# Patient Record
Sex: FEMALE | Race: WHITE | HISPANIC OR LATINO
[De-identification: names, ages, dates, MRNs, and addresses within clinical notes are randomized per-mention and may not be internally consistent; named-entity substitution may affect disease eponyms.]

---

## 2019-06-21 ENCOUNTER — RESULT REVIEW (OUTPATIENT)
Age: 29
End: 2019-06-21

## 2020-08-10 ENCOUNTER — EMERGENCY (EMERGENCY)
Facility: HOSPITAL | Age: 30
LOS: 1 days | Discharge: HOME | End: 2020-08-10
Attending: EMERGENCY MEDICINE
Payer: COMMERCIAL

## 2020-08-10 VITALS
WEIGHT: 187.39 LBS | SYSTOLIC BLOOD PRESSURE: 101 MMHG | OXYGEN SATURATION: 100 % | HEIGHT: 62.6 IN | HEART RATE: 90 BPM | RESPIRATION RATE: 22 BRPM | TEMPERATURE: 98 F | DIASTOLIC BLOOD PRESSURE: 63 MMHG

## 2020-08-10 LAB
ALBUMIN SERPL ELPH-MCNC: 3.5 G/DL — SIGNIFICANT CHANGE UP (ref 3.5–5)
ALP SERPL-CCNC: 65 U/L — SIGNIFICANT CHANGE UP (ref 40–120)
ALT FLD-CCNC: 16 U/L DA — SIGNIFICANT CHANGE UP (ref 10–60)
ANION GAP SERPL CALC-SCNC: 6 MMOL/L — SIGNIFICANT CHANGE UP (ref 5–17)
AST SERPL-CCNC: 10 U/L — SIGNIFICANT CHANGE UP (ref 10–40)
BASOPHILS # BLD AUTO: 0.05 K/UL — SIGNIFICANT CHANGE UP (ref 0–0.2)
BASOPHILS NFR BLD AUTO: 0.5 % — SIGNIFICANT CHANGE UP (ref 0–2)
BILIRUB SERPL-MCNC: 0.3 MG/DL — SIGNIFICANT CHANGE UP (ref 0.2–1.2)
BUN SERPL-MCNC: 10 MG/DL — SIGNIFICANT CHANGE UP (ref 7–18)
CALCIUM SERPL-MCNC: 9.2 MG/DL — SIGNIFICANT CHANGE UP (ref 8.4–10.5)
CHLORIDE SERPL-SCNC: 102 MMOL/L — SIGNIFICANT CHANGE UP (ref 96–108)
CO2 SERPL-SCNC: 30 MMOL/L — SIGNIFICANT CHANGE UP (ref 22–31)
CREAT SERPL-MCNC: 0.83 MG/DL — SIGNIFICANT CHANGE UP (ref 0.5–1.3)
D DIMER BLD IA.RAPID-MCNC: 188 NG/ML DDU — SIGNIFICANT CHANGE UP
EOSINOPHIL # BLD AUTO: 0.19 K/UL — SIGNIFICANT CHANGE UP (ref 0–0.5)
EOSINOPHIL NFR BLD AUTO: 2.1 % — SIGNIFICANT CHANGE UP (ref 0–6)
GLUCOSE SERPL-MCNC: 94 MG/DL — SIGNIFICANT CHANGE UP (ref 70–99)
HCT VFR BLD CALC: 39.1 % — SIGNIFICANT CHANGE UP (ref 34.5–45)
HGB BLD-MCNC: 13 G/DL — SIGNIFICANT CHANGE UP (ref 11.5–15.5)
IMM GRANULOCYTES NFR BLD AUTO: 0.2 % — SIGNIFICANT CHANGE UP (ref 0–1.5)
LYMPHOCYTES # BLD AUTO: 3.06 K/UL — SIGNIFICANT CHANGE UP (ref 1–3.3)
LYMPHOCYTES # BLD AUTO: 33.1 % — SIGNIFICANT CHANGE UP (ref 13–44)
MCHC RBC-ENTMCNC: 27.5 PG — SIGNIFICANT CHANGE UP (ref 27–34)
MCHC RBC-ENTMCNC: 33.2 GM/DL — SIGNIFICANT CHANGE UP (ref 32–36)
MCV RBC AUTO: 82.7 FL — SIGNIFICANT CHANGE UP (ref 80–100)
MONOCYTES # BLD AUTO: 0.63 K/UL — SIGNIFICANT CHANGE UP (ref 0–0.9)
MONOCYTES NFR BLD AUTO: 6.8 % — SIGNIFICANT CHANGE UP (ref 2–14)
NEUTROPHILS # BLD AUTO: 5.29 K/UL — SIGNIFICANT CHANGE UP (ref 1.8–7.4)
NEUTROPHILS NFR BLD AUTO: 57.3 % — SIGNIFICANT CHANGE UP (ref 43–77)
NRBC # BLD: 0 /100 WBCS — SIGNIFICANT CHANGE UP (ref 0–0)
NT-PROBNP SERPL-SCNC: <5 PG/ML — SIGNIFICANT CHANGE UP (ref 0–125)
PLATELET # BLD AUTO: 311 K/UL — SIGNIFICANT CHANGE UP (ref 150–400)
POTASSIUM SERPL-MCNC: 4.2 MMOL/L — SIGNIFICANT CHANGE UP (ref 3.5–5.3)
POTASSIUM SERPL-SCNC: 4.2 MMOL/L — SIGNIFICANT CHANGE UP (ref 3.5–5.3)
PROT SERPL-MCNC: 7.7 G/DL — SIGNIFICANT CHANGE UP (ref 6–8.3)
RBC # BLD: 4.73 M/UL — SIGNIFICANT CHANGE UP (ref 3.8–5.2)
RBC # FLD: 13 % — SIGNIFICANT CHANGE UP (ref 10.3–14.5)
SODIUM SERPL-SCNC: 138 MMOL/L — SIGNIFICANT CHANGE UP (ref 135–145)
TROPONIN I SERPL-MCNC: <0.015 NG/ML — SIGNIFICANT CHANGE UP (ref 0–0.04)
WBC # BLD: 9.24 K/UL — SIGNIFICANT CHANGE UP (ref 3.8–10.5)
WBC # FLD AUTO: 9.24 K/UL — SIGNIFICANT CHANGE UP (ref 3.8–10.5)

## 2020-08-10 PROCEDURE — 80053 COMPREHEN METABOLIC PANEL: CPT

## 2020-08-10 PROCEDURE — 83880 ASSAY OF NATRIURETIC PEPTIDE: CPT

## 2020-08-10 PROCEDURE — 85379 FIBRIN DEGRADATION QUANT: CPT

## 2020-08-10 PROCEDURE — 84484 ASSAY OF TROPONIN QUANT: CPT

## 2020-08-10 PROCEDURE — 93005 ELECTROCARDIOGRAM TRACING: CPT

## 2020-08-10 PROCEDURE — U0003: CPT

## 2020-08-10 PROCEDURE — 36415 COLL VENOUS BLD VENIPUNCTURE: CPT

## 2020-08-10 PROCEDURE — 99284 EMERGENCY DEPT VISIT MOD MDM: CPT

## 2020-08-10 PROCEDURE — 85027 COMPLETE CBC AUTOMATED: CPT

## 2020-08-10 PROCEDURE — 99283 EMERGENCY DEPT VISIT LOW MDM: CPT

## 2020-08-10 NOTE — ED PROVIDER NOTE - NSFOLLOWUPINSTRUCTIONS_ED_ALL_ED_FT
We have swabbed you for COVID today and/or sent your antibodies.  Results can take from 1-5 days, depending on how backed up other laboratory is.  Remember, the results are only 70% accurate, so if you have COVID symptoms, you still likely have COVID even if your test is negative.  We rely on SYMPTOMS more than the test.    **If you HAVE SYMPTOMS, please SELF ISOLATE for 14 DAYS MINIMUM  **If you HAVE FEVER, you must SELF ISOLATE until the fever is gone, PLUS ONE DAY.    (For Example, If you have FEVER for 6 days, then you must ISOLATE for 14 days (that is the minimum)  If you have FEVER for 18 days, and then on day 19 you have no fever, you must wait 24 hours, so you can leave your house/interact with others on Day 20.)    ** If you had exposure to COVID but no symptoms and are COVID POSITIVE,  YOU ARE TO QUARENTINE FOR 10 DAYS    **** If you had exposure to COVID but have no symptoms and are COVID NEGATIVE, we still recommend you QUARENTINE FOR 10 DAYS, because the test is only 70% accurate, so sometimes it is wrong.  You staying home and isolated will help [place] COVID cases stay low.     Thankfully survival rates are much better than they were before.  The fear and anxiety that comes from COVID often times has longer lasting effects than the disease itself.  Please stay calm.    If you are having symptoms, we recommend you buy a PULSE OXIMETER to check your Oxygen level every day and/or if you're short of breath.  If your Oxygen is < 93% or you are severely short of breath, return to the ER.  Otherwise, stay home and isolated.      TIPS TO HELP FIGHT SYMPTOMS AND MINIMIZE SPREAD:    1. STAY HOME for at least 14 DAYS.  Wait until you have NO SYMPTOMS FOR 3 days before you leave your house.    2. Stay 6 feet away from everyone in your life, especially the elderly.  If you live with elderly, try to find them (or yourself) another place to stay.      3. If you have sypmtoms, ALWAYS WEAR A MASK, even at home, to protect your family.    4.  Sleep in a seperate bed from anyone who do not have symptoms.       5. Family member should leave food outside our door then leave and you can open your door to retrieve it.    6.  Keep your dirty dishes in your room until you are better (3 days with NO FEVER).  Consider using plasticware.      7.  Wash your hands every time you touch something and EVERY HOUR for 20 seconds each time.      8. DRINK ONE LITER of Pedialyte or  Gatorade per day to hlep with fatigue, lightheadedness, and body aches.     9.  Take TYLENOL 650 mg every 6 hours for fever, body pain or headache.  Take Ibuprofen 600 mg if your pain or fever continues despite fluids and Tylenol.   You can take Tylenol and ibuprofen together for added effect.     9. For trouble breathing and coughing         - LAY ON YOUR STOMACH for ten minutes every hour.  This opens up your lungs in the back.           - SLEEP ON YOUR STOMACH all night         - Use over the counter Cough syrup          - Use an albuterol inhaler    10. Some supplements to help boost your immune system: Zinc 200 mg daily              Multivitamin, Omega 3, Vitamin B12, C, D, E and Tumeric    11. RETURN TO THE ER IMMEDIATELY IF YOU HAVE WORSENING SHORTNESS OF BREATH, CONFUSION or YOUR LIPS/FACE TURN BLUE

## 2020-08-10 NOTE — ED PROVIDER NOTE - PATIENT PORTAL LINK FT
You can access the FollowMyHealth Patient Portal offered by [facility] by registering at the following website: [email or web address]. By joining [employer]’s FollowMyHealth portal, you will also be able to view your health information using other applications (apps) compatible with our system.

## 2020-08-10 NOTE — ED PROVIDER NOTE - OBJECTIVE STATEMENT
30 y/o F with no significant PMHx/PSHx presents to the ED with complaints of bilateral arm aches, L calf and L thigh pain, and difficulty breathing. Patient states she went to [place] for 1 week and while there had a breast reduction. Patient reports having a negative COVID-19 test while in [place] in the last week. Patient states it feels like a knot in her chest and that she cannot breathe. Patient had CXR done at an urgent care that was normal.

## 2020-08-10 NOTE — ED PROVIDER NOTE - CLINICAL SUMMARY MEDICAL DECISION MAKING FREE TEXT BOX
28 y/o F presents with difficulty breathing and body aches, either COVID-19 or PE/DVT. Will perform labs and reassess.

## 2020-08-11 LAB — SARS-COV-2 RNA SPEC QL NAA+PROBE: SIGNIFICANT CHANGE UP

## 2022-05-16 ENCOUNTER — APPOINTMENT (OUTPATIENT)
Dept: ORTHOPEDIC SURGERY | Facility: CLINIC | Age: 32
End: 2022-05-16
Payer: COMMERCIAL

## 2022-05-16 VITALS — HEIGHT: 63 IN | BODY MASS INDEX: 32.78 KG/M2 | WEIGHT: 185 LBS

## 2022-05-16 DIAGNOSIS — Z78.9 OTHER SPECIFIED HEALTH STATUS: ICD-10-CM

## 2022-05-16 DIAGNOSIS — M25.569 PAIN IN UNSPECIFIED KNEE: ICD-10-CM

## 2022-05-16 DIAGNOSIS — M22.2X2 PATELLOFEMORAL DISORDERS, LEFT KNEE: ICD-10-CM

## 2022-05-16 PROCEDURE — 73564 X-RAY EXAM KNEE 4 OR MORE: CPT | Mod: LT

## 2022-05-16 PROCEDURE — 99204 OFFICE O/P NEW MOD 45 MIN: CPT

## 2022-05-16 RX ORDER — MELOXICAM 15 MG/1
15 TABLET ORAL
Qty: 30 | Refills: 2 | Status: ACTIVE | COMMUNITY
Start: 2022-05-16 | End: 1900-01-01

## 2022-05-16 NOTE — ASSESSMENT
[FreeTextEntry1] : 31 year F with left knee pain/PFS\par - physical therapy, NSAIDs\par - Return in 6 weeks for follow up

## 2022-05-16 NOTE — IMAGING
[de-identified] : Constitutional: well developed and well nourished, able to communicate\par Cardiovascular: Peripheral vascular exam is grossly normal\par Neurologic: Alert and oriented, no acute distress.\par Skin: normal skin with no ulcers, rashes, or lesions\par Pulmonary: No respiratory distress, breathing comfortably on room air\par Lymphatics: No obvious lymphadenopathy or lymphedema in areas examined\par \par LEFT KNEE EXAM\par Alignment: Neutral \par Effusion: None\par Atrophy: None                                                 \par Stable to Varus/valgus stress\par Posterior Drawer Test: negative\par Anterior Drawer Test: Negative\par Knee Extension/Flexion: 0 / 120\par \par Medial/lateral compartments\par Medial joint line: No Tenderness\par Lateral joint line: No Tenderness\par Yana test: negative\par \par Patellofemoral joint\par Medial patellar facet: no tenderness\par Patellar grind: pos\par \par Tendons:\par Pes Anserine: No tenderness\par Gerdy’s Tubercle/ IT Band: No tenderness\par Quadriceps Tendon: Tenderness\par patellar tendon: no Tenderness\par Tibial tubercle: not tenderness\par Calf: no Tenderness\par \par Neurovascular exam\par Muscle strength: 5/5\par Sensation to light touch: intact\par Distal pulses: 2+\par  \par IMAGING:\par 05/16/2022 Xrays of the left knee 4v were taken today demonstrating no signs of fractures, dislocations,or significant arthritis.\par \par

## 2022-05-16 NOTE — HISTORY OF PRESENT ILLNESS
[Sudden] : sudden [8] : 8 [4] : 4 [Constant] : constant [Heat] : heat [de-identified] : 05/16/2022: a 32 yo female, presents for evaluation of left knee injury after exercising a year ago (2021). the pain is constant and nonradiating. it is associated with clicking sound and sometimes give out. no swelling/fever/tingling/numbness/stiffness/prior left knee issue. hot compression, reduction in weight bearing has help since noticed. no pain medication/X-ray/PT. patient felt a pop. \par \par no PT/ibuprofen [] : no [FreeTextEntry1] : left knee [FreeTextEntry9] : inactivities [de-identified] : inactivities

## 2022-06-27 ENCOUNTER — APPOINTMENT (OUTPATIENT)
Dept: ORTHOPEDIC SURGERY | Facility: CLINIC | Age: 32
End: 2022-06-27

## 2024-07-24 ENCOUNTER — APPOINTMENT (OUTPATIENT)
Dept: BARIATRICS | Facility: CLINIC | Age: 34
End: 2024-07-24

## 2024-07-24 VITALS
BODY MASS INDEX: 29.95 KG/M2 | TEMPERATURE: 98.2 F | HEIGHT: 63 IN | WEIGHT: 169 LBS | SYSTOLIC BLOOD PRESSURE: 109 MMHG | HEART RATE: 91 BPM | DIASTOLIC BLOOD PRESSURE: 77 MMHG | OXYGEN SATURATION: 99 %

## 2024-07-24 DIAGNOSIS — Z98.84 BARIATRIC SURGERY STATUS: ICD-10-CM

## 2024-07-24 DIAGNOSIS — Z00.00 ENCOUNTER FOR GENERAL ADULT MEDICAL EXAMINATION W/OUT ABNORMAL FINDINGS: ICD-10-CM

## 2024-07-24 DIAGNOSIS — K21.9 GASTRO-ESOPHAGEAL REFLUX DISEASE W/OUT ESOPHAGITIS: ICD-10-CM

## 2024-07-24 PROBLEM — Z78.9 OTHER SPECIFIED HEALTH STATUS: Chronic | Status: ACTIVE | Noted: 2020-08-14

## 2024-07-24 PROCEDURE — 99204 OFFICE O/P NEW MOD 45 MIN: CPT

## 2024-07-26 NOTE — HISTORY OF PRESENT ILLNESS
[de-identified] : Pt is a 34 y/o F s/p VSG in [facility] 2 years ago now c/b severe GERD, heart murmur, current BMI 29, who presents today feeling well here to establish bariatric care. Pt states she has been suffering from GERD for the past year and reports daily burning in her chest and throat after she eats or drinks anything. Pt reports daily vomiting which occurs usually 1 hr after eating, will occur immediately if she doesn't chew her food well. States she is scared to eat solid foods and focuses more on consuming protein shakes and bone broth. Recommended 2 protein shakes daily to ensure adequate nutrition and will obtain EGD BRAVO and UGI to further assess her GERD and sleeve. Pt denies any other abd pn, c,d. Will obtain labs today and consult with nutrition. No other concerns at this time.

## 2024-07-26 NOTE — HISTORY OF PRESENT ILLNESS
[de-identified] : Pt is a 32 y/o F s/p VSG in [facility] 2 years ago now c/b severe GERD, heart murmur, current BMI 29, who presents today feeling well here to establish bariatric care. Pt states she has been suffering from GERD for the past year and reports daily burning in her chest and throat after she eats or drinks anything. Pt reports daily vomiting which occurs usually 1 hr after eating, will occur immediately if she doesn't chew her food well. States she is scared to eat solid foods and focuses more on consuming protein shakes and bone broth. Recommended 2 protein shakes daily to ensure adequate nutrition and will obtain EGD BRAVO and UGI to further assess her GERD and sleeve. Pt denies any other abd pn, c,d. Will obtain labs today and consult with nutrition. No other concerns at this time.

## 2024-07-26 NOTE — ASSESSMENT
[FreeTextEntry1] : Pt is a 32 y/o F s/p VSG in [facility] 2 years ago now c/b severe GERD, heart murmur, current BMI 29, who presents today feeling well here to establish bariatric care. Pt states she has been suffering from GERD for the past year and reports daily burning in her chest and throat after she eats or drinks anything. Pt reports daily vomiting which occurs usually 1 hr after eating, will occur immediately if she doesn't chew her food well. States she is scared to eat solid foods and focuses more on consuming protein shakes and bone broth. Recommended 2 protein shakes daily to ensure adequate nutrition and will obtain EGD BRAVO and UGI to further assess her GERD and sleeve. Pt denies any other abd pn, c,d. Will obtain labs today and consult with nutrition. No other concerns at this time.

## 2024-07-26 NOTE — PLAN
[FreeTextEntry1] : labs nutrition egd bravo UGI f/u after above testings, plan for possible conversion sx Good

## 2024-07-26 NOTE — ASSESSMENT
[FreeTextEntry1] : Pt is a 32 y/o F s/p VSG in [facility] 2 years ago now c/b severe GERD, heart murmur, current BMI 29, who presents today feeling well here to establish bariatric care. Pt states she has been suffering from GERD for the past year and reports daily burning in her chest and throat after she eats or drinks anything. Pt reports daily vomiting which occurs usually 1 hr after eating, will occur immediately if she doesn't chew her food well. States she is scared to eat solid foods and focuses more on consuming protein shakes and bone broth. Recommended 2 protein shakes daily to ensure adequate nutrition and will obtain EGD BRAVO and UGI to further assess her GERD and sleeve. Pt denies any other abd pn, c,d. Will obtain labs today and consult with nutrition. No other concerns at this time.

## 2024-07-26 NOTE — PLAN
[FreeTextEntry1] : labs nutrition egd bravo UGI f/u after above testings, plan for possible conversion sx

## 2024-07-26 NOTE — ADDENDUM
[FreeTextEntry1] : I, Dr. Amado Duarte, spent 50 minutes with the patient >50% counseling/coordination of care including, reviewing the patient's history, performing an examination, reviewing relevant labs and radiographic imaging, reviewing PCP and consultant notes, discussion of medical and surgical management of the diagnosis as well as associated risks and benefits, and completing documentation.

## 2024-07-31 ENCOUNTER — APPOINTMENT (OUTPATIENT)
Dept: BARIATRICS | Facility: CLINIC | Age: 34
End: 2024-07-31
Payer: COMMERCIAL

## 2024-07-31 PROCEDURE — 97802 MEDICAL NUTRITION INDIV IN: CPT | Mod: 93

## 2024-08-02 LAB
25(OH)D3 SERPL-MCNC: 19.5 NG/ML
ALBUMIN SERPL ELPH-MCNC: 4.2 G/DL
ALP BLD-CCNC: 63 U/L
ALT SERPL-CCNC: 12 U/L
ANION GAP SERPL CALC-SCNC: 11 MMOL/L
APTT BLD: 31.3 SEC
AST SERPL-CCNC: 13 U/L
BASOPHILS # BLD AUTO: 0.04 K/UL
BASOPHILS NFR BLD AUTO: 0.6 %
BILIRUB SERPL-MCNC: 0.3 MG/DL
BUN SERPL-MCNC: 22 MG/DL
CALCIUM SERPL-MCNC: 9.7 MG/DL
CALCIUM SERPL-MCNC: 9.7 MG/DL
CHLORIDE SERPL-SCNC: 105 MMOL/L
CHOLEST SERPL-MCNC: 201 MG/DL
CO2 SERPL-SCNC: 22 MMOL/L
CREAT SERPL-MCNC: 0.76 MG/DL
EGFR: 107 ML/MIN/1.73M2
EOSINOPHIL # BLD AUTO: 0.09 K/UL
EOSINOPHIL NFR BLD AUTO: 1.3 %
ESTIMATED AVERAGE GLUCOSE: 108 MG/DL
FOLATE SERPL-MCNC: 13.3 NG/ML
GLUCOSE SERPL-MCNC: 104 MG/DL
HBA1C MFR BLD HPLC: 5.4 %
HCT VFR BLD CALC: 39.2 %
HDLC SERPL-MCNC: 58 MG/DL
HGB BLD-MCNC: 12.4 G/DL
IMM GRANULOCYTES NFR BLD AUTO: 0.3 %
INR PPP: 0.96 RATIO
IRON SATN MFR SERPL: 28 %
IRON SERPL-MCNC: 119 UG/DL
LDLC SERPL CALC-MCNC: 122 MG/DL
LYMPHOCYTES # BLD AUTO: 2.24 K/UL
LYMPHOCYTES NFR BLD AUTO: 32.8 %
MAN DIFF?: NORMAL
MCHC RBC-ENTMCNC: 25.6 PG
MCHC RBC-ENTMCNC: 31.6 GM/DL
MCV RBC AUTO: 80.8 FL
MONOCYTES # BLD AUTO: 0.54 K/UL
MONOCYTES NFR BLD AUTO: 7.9 %
NEUTROPHILS # BLD AUTO: 3.9 K/UL
NEUTROPHILS NFR BLD AUTO: 57.1 %
NONHDLC SERPL-MCNC: 143 MG/DL
PARATHYROID HORMONE INTACT: 22 PG/ML
PLATELET # BLD AUTO: 241 K/UL
POTASSIUM SERPL-SCNC: 4.3 MMOL/L
PREALB SERPL NEPH-MCNC: 18 MG/DL
PROT SERPL-MCNC: 6.9 G/DL
PT BLD: 10.9 SEC
RBC # BLD: 4.85 M/UL
RBC # FLD: 15.4 %
SODIUM SERPL-SCNC: 139 MMOL/L
TIBC SERPL-MCNC: 424 UG/DL
TRIGL SERPL-MCNC: 122 MG/DL
TSH SERPL-ACNC: 1.11 UIU/ML
UIBC SERPL-MCNC: 305 UG/DL
VIT B12 SERPL-MCNC: 528 PG/ML
WBC # FLD AUTO: 6.83 K/UL

## 2024-08-03 LAB — CA-I SERPL-SCNC: 5.1 MG/DL

## 2024-08-05 ENCOUNTER — OUTPATIENT (OUTPATIENT)
Dept: OUTPATIENT SERVICES | Facility: HOSPITAL | Age: 34
LOS: 1 days | Discharge: HOME | End: 2024-08-05
Payer: COMMERCIAL

## 2024-08-05 ENCOUNTER — RESULT REVIEW (OUTPATIENT)
Age: 34
End: 2024-08-05

## 2024-08-05 ENCOUNTER — TRANSCRIPTION ENCOUNTER (OUTPATIENT)
Age: 34
End: 2024-08-05

## 2024-08-05 VITALS
WEIGHT: 154.98 LBS | OXYGEN SATURATION: 100 % | HEIGHT: 63 IN | HEART RATE: 69 BPM | DIASTOLIC BLOOD PRESSURE: 80 MMHG | SYSTOLIC BLOOD PRESSURE: 121 MMHG | TEMPERATURE: 98 F | RESPIRATION RATE: 18 BRPM

## 2024-08-05 PROCEDURE — 43239 EGD BIOPSY SINGLE/MULTIPLE: CPT

## 2024-08-05 PROCEDURE — 91035 G-ESOPH REFLX TST W/ELECTROD: CPT

## 2024-08-05 PROCEDURE — C1889: CPT

## 2024-08-05 PROCEDURE — 88305 TISSUE EXAM BY PATHOLOGIST: CPT | Mod: 26

## 2024-08-05 PROCEDURE — 91035 G-ESOPH REFLX TST W/ELECTROD: CPT | Mod: 26

## 2024-08-05 DEVICE — IMPLANTABLE DEVICE: Type: IMPLANTABLE DEVICE | Status: FUNCTIONAL

## 2024-08-05 NOTE — PRE-ANESTHESIA EVALUATION ADULT - NSDENTALSD_ENT_ALL_CORE
Missing rear molars. Minor dental abrasions, small chips, minimal grinding/worn teeth. No open cavities noted./missing teeth

## 2024-08-05 NOTE — PRE-ANESTHESIA EVALUATION ADULT - NSANTHPMHFT_GEN_ALL_CORE
Cardiac: Positive for pediatric benign heart murmur. Denies HTN, HLD, MI/Angina/Heart Failure, Arrhythmia. >4 METS  Pulmonary: Denies Asthma, COPD, LAURENT  Renal: Denies kidney dysfunction  Hepatic: Denies liver dysfunction   Gastrointestinal: Positive for GERD. Denies IBS.  Endocrine: Denies DM or thyroid dysfunciton.  Neurologic: Denies stroke/seizure disorder  Hematologic: Denies anemia, blood clotting disorder, blood thinning medication.    PSH: Laparoscopic Sleeve Gastrectomy, 2022.

## 2024-08-05 NOTE — PRE-ANESTHESIA EVALUATION ADULT - NSANTHOSAYNRD_GEN_A_CORE
No. LAURENT screening performed.  STOP BANG Legend: 0-2 = LOW Risk; 3-4 = INTERMEDIATE Risk; 5-8 = HIGH Risk

## 2024-08-06 LAB — SURGICAL PATHOLOGY STUDY: SIGNIFICANT CHANGE UP

## 2024-08-20 ENCOUNTER — NON-APPOINTMENT (OUTPATIENT)
Age: 34
End: 2024-08-20

## 2024-08-21 ENCOUNTER — OUTPATIENT (OUTPATIENT)
Dept: OUTPATIENT SERVICES | Facility: HOSPITAL | Age: 34
LOS: 1 days | End: 2024-08-21

## 2024-08-21 ENCOUNTER — APPOINTMENT (OUTPATIENT)
Dept: BARIATRICS | Facility: CLINIC | Age: 34
End: 2024-08-21
Payer: COMMERCIAL

## 2024-08-21 ENCOUNTER — APPOINTMENT (OUTPATIENT)
Dept: RADIOLOGY | Facility: HOSPITAL | Age: 34
End: 2024-08-21
Payer: COMMERCIAL

## 2024-08-21 VITALS
DIASTOLIC BLOOD PRESSURE: 80 MMHG | BODY MASS INDEX: 30.3 KG/M2 | HEIGHT: 63 IN | HEART RATE: 66 BPM | OXYGEN SATURATION: 100 % | SYSTOLIC BLOOD PRESSURE: 117 MMHG | WEIGHT: 171 LBS | TEMPERATURE: 97 F

## 2024-08-21 PROCEDURE — G2211 COMPLEX E/M VISIT ADD ON: CPT | Mod: NC

## 2024-08-21 PROCEDURE — 74240 X-RAY XM UPR GI TRC 1CNTRST: CPT | Mod: 26

## 2024-08-21 PROCEDURE — 74240 X-RAY XM UPR GI TRC 1CNTRST: CPT

## 2024-08-21 PROCEDURE — 99214 OFFICE O/P EST MOD 30 MIN: CPT

## 2024-08-21 NOTE — HISTORY OF PRESENT ILLNESS
[de-identified] : Patient is a 33 year old woman with history of morbid obesity s/p sleeve gastrectomy in [facility] 2 years ago now complicated by refractory GERD. Since last visit, she reports ongoing symptoms of reflux, heartburn, intermittent dysphagia. She underwent EGD and UGI that demonstrated evidence of kinking and twisting of the sleeve body with possible retained fundus, likely etiology of her symptoms. Denies fevers, chills, chest pain, dyspnea, dysuria, hematochezia, melena. Labs also reviewed, elevated cholesterol and Vitamin D deficiency noted. At time of evaluation, afebrile, hemodynamically stable, abdomen soft, nontender, nondistended, no rebound or guarding.

## 2024-08-21 NOTE — REVIEW OF SYSTEMS
[Abdominal Pain] : abdominal pain [Vomiting] : vomiting [Reflux/Heartburn] : reflux/heartburn [Negative] : Allergic/Immunologic

## 2024-08-28 ENCOUNTER — APPOINTMENT (OUTPATIENT)
Dept: BARIATRICS | Facility: CLINIC | Age: 34
End: 2024-08-28
Payer: COMMERCIAL

## 2024-08-28 PROCEDURE — 97803 MED NUTRITION INDIV SUBSEQ: CPT | Mod: 93

## 2024-09-17 ENCOUNTER — APPOINTMENT (OUTPATIENT)
Dept: BARIATRICS | Facility: CLINIC | Age: 34
End: 2024-09-17

## 2024-09-24 ENCOUNTER — APPOINTMENT (OUTPATIENT)
Dept: BARIATRICS | Facility: CLINIC | Age: 34
End: 2024-09-24

## 2024-09-30 ENCOUNTER — TRANSCRIPTION ENCOUNTER (OUTPATIENT)
Age: 34
End: 2024-09-30

## 2024-09-30 VITALS
RESPIRATION RATE: 14 BRPM | SYSTOLIC BLOOD PRESSURE: 116 MMHG | WEIGHT: 169.98 LBS | TEMPERATURE: 98 F | HEART RATE: 88 BPM | OXYGEN SATURATION: 99 % | HEIGHT: 63 IN | DIASTOLIC BLOOD PRESSURE: 82 MMHG

## 2024-09-30 DIAGNOSIS — Z98.84 BARIATRIC SURGERY STATUS: ICD-10-CM

## 2024-09-30 DIAGNOSIS — Z00.00 ENCOUNTER FOR GENERAL ADULT MEDICAL EXAMINATION W/OUT ABNORMAL FINDINGS: ICD-10-CM

## 2024-09-30 NOTE — PATIENT PROFILE ADULT - FALL HARM RISK - UNIVERSAL INTERVENTIONS
Bed in lowest position, wheels locked, appropriate side rails in place/Call bell, personal items and telephone in reach/Instruct patient to call for assistance before getting out of bed or chair/Non-slip footwear when patient is out of bed/[place] to call system/Physically safe environment - no spills, clutter or unnecessary equipment/Purposeful Proactive Rounding/Room/bathroom lighting operational, light cord in reach

## 2024-10-01 ENCOUNTER — APPOINTMENT (OUTPATIENT)
Dept: BARIATRICS | Facility: HOSPITAL | Age: 34
End: 2024-10-01

## 2024-10-01 ENCOUNTER — TRANSCRIPTION ENCOUNTER (OUTPATIENT)
Age: 34
End: 2024-10-01

## 2024-10-01 ENCOUNTER — INPATIENT (INPATIENT)
Facility: HOSPITAL | Age: 34
LOS: 1 days | Discharge: HOME | End: 2024-10-03
Attending: STUDENT IN AN ORGANIZED HEALTH CARE EDUCATION/TRAINING PROGRAM | Admitting: STUDENT IN AN ORGANIZED HEALTH CARE EDUCATION/TRAINING PROGRAM
Payer: COMMERCIAL

## 2024-10-01 DIAGNOSIS — Z90.3 ACQUIRED ABSENCE OF STOMACH [PART OF]: Chronic | ICD-10-CM

## 2024-10-01 LAB
ALBUMIN SERPL ELPH-MCNC: 4.4 G/DL
ALP BLD-CCNC: 68 U/L
ALT SERPL-CCNC: 13 U/L
ANION GAP SERPL CALC-SCNC: 11 MMOL/L
ANION GAP SERPL CALC-SCNC: 9 MMOL/L — SIGNIFICANT CHANGE UP (ref 5–17)
AST SERPL-CCNC: 14 U/L
BILIRUB SERPL-MCNC: 0.2 MG/DL
BUN SERPL-MCNC: 13 MG/DL — SIGNIFICANT CHANGE UP (ref 7–23)
BUN SERPL-MCNC: 18 MG/DL
CALCIUM SERPL-MCNC: 9.3 MG/DL — SIGNIFICANT CHANGE UP (ref 8.4–10.5)
CALCIUM SERPL-MCNC: 9.7 MG/DL
CHLORIDE SERPL-SCNC: 101 MMOL/L
CHLORIDE SERPL-SCNC: 102 MMOL/L — SIGNIFICANT CHANGE UP (ref 96–108)
CO2 SERPL-SCNC: 26 MMOL/L
CO2 SERPL-SCNC: 27 MMOL/L — SIGNIFICANT CHANGE UP (ref 22–31)
CREAT SERPL-MCNC: 0.75 MG/DL
CREAT SERPL-MCNC: 0.78 MG/DL — SIGNIFICANT CHANGE UP (ref 0.5–1.3)
EGFR: 104 ML/MIN/1.73M2 — SIGNIFICANT CHANGE UP
EGFR: 109 ML/MIN/1.73M2
GLUCOSE SERPL-MCNC: 102 MG/DL — HIGH (ref 70–99)
GLUCOSE SERPL-MCNC: 84 MG/DL
HCT VFR BLD CALC: 35.8 % — SIGNIFICANT CHANGE UP (ref 34.5–45)
HGB BLD-MCNC: 11.5 G/DL — SIGNIFICANT CHANGE UP (ref 11.5–15.5)
MCHC RBC-ENTMCNC: 25.4 PG — LOW (ref 27–34)
MCHC RBC-ENTMCNC: 32.1 GM/DL — SIGNIFICANT CHANGE UP (ref 32–36)
MCV RBC AUTO: 79 FL — LOW (ref 80–100)
NRBC # BLD: 0 /100 WBCS — SIGNIFICANT CHANGE UP (ref 0–0)
PLATELET # BLD AUTO: 240 K/UL — SIGNIFICANT CHANGE UP (ref 150–400)
POTASSIUM SERPL-MCNC: 4 MMOL/L — SIGNIFICANT CHANGE UP (ref 3.5–5.3)
POTASSIUM SERPL-SCNC: 4 MMOL/L — SIGNIFICANT CHANGE UP (ref 3.5–5.3)
POTASSIUM SERPL-SCNC: 4.4 MMOL/L
PROT SERPL-MCNC: 7.3 G/DL
RBC # BLD: 4.53 M/UL — SIGNIFICANT CHANGE UP (ref 3.8–5.2)
RBC # FLD: 13.8 % — SIGNIFICANT CHANGE UP (ref 10.3–14.5)
SODIUM SERPL-SCNC: 138 MMOL/L — SIGNIFICANT CHANGE UP (ref 135–145)
SODIUM SERPL-SCNC: 139 MMOL/L
WBC # BLD: 12.31 K/UL — HIGH (ref 3.8–10.5)
WBC # FLD AUTO: 12.31 K/UL — HIGH (ref 3.8–10.5)

## 2024-10-01 PROCEDURE — 43644 LAP GASTRIC BYPASS/ROUX-EN-Y: CPT | Mod: AS

## 2024-10-01 PROCEDURE — S2900 ROBOTIC SURGICAL SYSTEM: CPT | Mod: NC

## 2024-10-01 PROCEDURE — 43644 LAP GASTRIC BYPASS/ROUX-EN-Y: CPT

## 2024-10-01 DEVICE — XI STAPLER SUREFORM RELOAD 60 BLUE: Type: IMPLANTABLE DEVICE | Status: FUNCTIONAL

## 2024-10-01 DEVICE — XI STAPLER SUREFORM RELOAD 60 WHITE: Type: IMPLANTABLE DEVICE | Status: FUNCTIONAL

## 2024-10-01 RX ORDER — ACETAMINOPHEN 325 MG
1000 TABLET ORAL ONCE
Refills: 0 | Status: COMPLETED | OUTPATIENT
Start: 2024-10-01 | End: 2024-10-01

## 2024-10-01 RX ORDER — KETOROLAC TROMETHAMINE 10 MG/1
15 TABLET, FILM COATED ORAL EVERY 6 HOURS
Refills: 0 | Status: DISCONTINUED | OUTPATIENT
Start: 2024-10-01 | End: 2024-10-01

## 2024-10-01 RX ORDER — ACETAMINOPHEN 325 MG
1000 TABLET ORAL ONCE
Refills: 0 | Status: DISCONTINUED | OUTPATIENT
Start: 2024-10-01 | End: 2024-10-01

## 2024-10-01 RX ORDER — PANTOPRAZOLE SODIUM 40 MG/1
40 TABLET, DELAYED RELEASE ORAL DAILY
Refills: 0 | Status: DISCONTINUED | OUTPATIENT
Start: 2024-10-01 | End: 2024-10-03

## 2024-10-01 RX ORDER — HYDROMORPHONE HYDROCHLORIDE 1 MG/ML
0.25 INJECTION, SOLUTION INTRAMUSCULAR; INTRAVENOUS; SUBCUTANEOUS EVERY 6 HOURS
Refills: 0 | Status: DISCONTINUED | OUTPATIENT
Start: 2024-10-01 | End: 2024-10-02

## 2024-10-01 RX ORDER — ACETAMINOPHEN 325 MG
1000 TABLET ORAL ONCE
Refills: 0 | Status: COMPLETED | OUTPATIENT
Start: 2024-10-02 | End: 2024-10-02

## 2024-10-01 RX ORDER — APREPITANT 125MG-80MG
80 KIT ORAL ONCE
Refills: 0 | Status: COMPLETED | OUTPATIENT
Start: 2024-10-01 | End: 2024-10-01

## 2024-10-01 RX ORDER — HYDROMORPHONE HYDROCHLORIDE 1 MG/ML
0.5 INJECTION, SOLUTION INTRAMUSCULAR; INTRAVENOUS; SUBCUTANEOUS
Refills: 0 | Status: DISCONTINUED | OUTPATIENT
Start: 2024-10-01 | End: 2024-10-02

## 2024-10-01 RX ORDER — SCOPOLAMINE 1 MG/3D
1 PATCH, EXTENDED RELEASE TRANSDERMAL ONCE
Refills: 0 | Status: COMPLETED | OUTPATIENT
Start: 2024-10-01 | End: 2024-10-01

## 2024-10-01 RX ORDER — THIAMINE HYDROCHLORIDE 100 MG/ML
500 INJECTION, SOLUTION INTRAMUSCULAR; INTRAVENOUS EVERY 24 HOURS
Refills: 0 | Status: DISCONTINUED | OUTPATIENT
Start: 2024-10-01 | End: 2024-10-03

## 2024-10-01 RX ORDER — KETOROLAC TROMETHAMINE 10 MG/1
15 TABLET, FILM COATED ORAL EVERY 6 HOURS
Refills: 0 | Status: DISCONTINUED | OUTPATIENT
Start: 2024-10-01 | End: 2024-10-03

## 2024-10-01 RX ORDER — SODIUM CHLORIDE IRRIG SOLUTION 0.9 %
500 SOLUTION, IRRIGATION IRRIGATION ONCE
Refills: 0 | Status: DISCONTINUED | OUTPATIENT
Start: 2024-10-01 | End: 2024-10-02

## 2024-10-01 RX ORDER — HYDROMORPHONE HYDROCHLORIDE 1 MG/ML
0.5 INJECTION, SOLUTION INTRAMUSCULAR; INTRAVENOUS; SUBCUTANEOUS EVERY 6 HOURS
Refills: 0 | Status: DISCONTINUED | OUTPATIENT
Start: 2024-10-01 | End: 2024-10-02

## 2024-10-01 RX ORDER — HYOSCYAMINE SULFATE 0.125 MG
0.12 TABLET ORAL EVERY 6 HOURS
Refills: 0 | Status: DISCONTINUED | OUTPATIENT
Start: 2024-10-01 | End: 2024-10-03

## 2024-10-01 RX ORDER — ENOXAPARIN SODIUM 150 MG/ML
40 INJECTION SUBCUTANEOUS ONCE
Refills: 0 | Status: COMPLETED | OUTPATIENT
Start: 2024-10-01 | End: 2024-10-01

## 2024-10-01 RX ORDER — ONDANSETRON HCL/PF 4 MG/2 ML
4 VIAL (ML) INJECTION EVERY 6 HOURS
Refills: 0 | Status: DISCONTINUED | OUTPATIENT
Start: 2024-10-01 | End: 2024-10-03

## 2024-10-01 RX ORDER — SODIUM CHLORIDE IRRIG SOLUTION 0.9 %
1000 SOLUTION, IRRIGATION IRRIGATION
Refills: 0 | Status: DISCONTINUED | OUTPATIENT
Start: 2024-10-01 | End: 2024-10-02

## 2024-10-01 RX ADMIN — HYDROMORPHONE HYDROCHLORIDE 0.5 MILLIGRAM(S): 1 INJECTION, SOLUTION INTRAMUSCULAR; INTRAVENOUS; SUBCUTANEOUS at 21:30

## 2024-10-01 RX ADMIN — PANTOPRAZOLE SODIUM 40 MILLIGRAM(S): 40 TABLET, DELAYED RELEASE ORAL at 14:31

## 2024-10-01 RX ADMIN — Medication 0.12 MILLIGRAM(S): at 18:49

## 2024-10-01 RX ADMIN — SCOPOLAMINE 1 PATCH: 1 PATCH, EXTENDED RELEASE TRANSDERMAL at 18:51

## 2024-10-01 RX ADMIN — Medication 400 MILLIGRAM(S): at 14:31

## 2024-10-01 RX ADMIN — HYDROMORPHONE HYDROCHLORIDE 0.5 MILLIGRAM(S): 1 INJECTION, SOLUTION INTRAMUSCULAR; INTRAVENOUS; SUBCUTANEOUS at 18:40

## 2024-10-01 RX ADMIN — Medication 4 MILLIGRAM(S): at 13:47

## 2024-10-01 RX ADMIN — Medication 1000 MILLIGRAM(S): at 19:40

## 2024-10-01 RX ADMIN — THIAMINE HYDROCHLORIDE 105 MILLIGRAM(S): 100 INJECTION, SOLUTION INTRAMUSCULAR; INTRAVENOUS at 18:16

## 2024-10-01 RX ADMIN — Medication 400 MILLIGRAM(S): at 19:16

## 2024-10-01 RX ADMIN — HYDROMORPHONE HYDROCHLORIDE 0.5 MILLIGRAM(S): 1 INJECTION, SOLUTION INTRAMUSCULAR; INTRAVENOUS; SUBCUTANEOUS at 20:51

## 2024-10-01 RX ADMIN — Medication 120 MILLILITER(S): at 18:50

## 2024-10-01 RX ADMIN — SCOPOLAMINE 1 PATCH: 1 PATCH, EXTENDED RELEASE TRANSDERMAL at 07:51

## 2024-10-01 RX ADMIN — KETOROLAC TROMETHAMINE 15 MILLIGRAM(S): 10 TABLET, FILM COATED ORAL at 17:30

## 2024-10-01 RX ADMIN — Medication 1000 MILLIGRAM(S): at 07:51

## 2024-10-01 RX ADMIN — HYDROMORPHONE HYDROCHLORIDE 0.5 MILLIGRAM(S): 1 INJECTION, SOLUTION INTRAMUSCULAR; INTRAVENOUS; SUBCUTANEOUS at 18:16

## 2024-10-01 RX ADMIN — KETOROLAC TROMETHAMINE 15 MILLIGRAM(S): 10 TABLET, FILM COATED ORAL at 17:16

## 2024-10-01 RX ADMIN — ENOXAPARIN SODIUM 40 MILLIGRAM(S): 150 INJECTION SUBCUTANEOUS at 07:51

## 2024-10-01 RX ADMIN — Medication 220 MILLIGRAM(S): at 22:10

## 2024-10-01 RX ADMIN — APREPITANT 80 MILLIGRAM(S): KIT at 07:57

## 2024-10-01 NOTE — DISCHARGE NOTE PROVIDER - NSDCCPCAREPLAN_GEN_ALL_CORE_FT
PRINCIPAL DISCHARGE DIAGNOSIS  Diagnosis: History of sleeve gastrectomy  Assessment and Plan of Treatment:      PRINCIPAL DISCHARGE DIAGNOSIS  Diagnosis: GERD (gastroesophageal reflux disease)  Assessment and Plan of Treatment: Rubina Hale is a 33 year old woman with history of morbid obesity s/p sleeve gastrectomy in [facility] 2 years ago now complicated by refractory GERD. She underwent EGD and UGI that demonstrated evidence of kinking and twisting of the sleeve body with possible retained fundus. Was admitted to [facility] for planned conversion of sleeve gastrectomy to RYGB. Now s/p RA Lap VSG to RYGB. Patient's post-operative course was uncomplicated. Diet was advanced as tolerated and pain was well controlled on medication. On day of discharge, pt deemed stable and ready to return home with plan to follow up as an outpatient.   1) Please take Tylenol 650 mg every 4 to 6 hours by mouth for moderate pain control. Please do not exceed over 4,000 mg of Tylenol a day.   2) Please start taking Eliquis 2.5 mg by mouth twice a day starting 3 days after surgery. Please start Friday October 4, 2024 .   3) Please take Omeprazole 40 mg once a day by mouth.   4)  Please take [id number] mg every 6 hours.   5) Please take Zofran 4 mg every 6 hours as needed for nausea and or vomiting.

## 2024-10-01 NOTE — H&P ADULT - NSICDXFAMILYHX_GEN_ALL_CORE_FT
FAMILY HISTORY:  Mother  Still living? Unknown  Family history of hypertension, Age at diagnosis: Age Unknown    Aunt  Still living? Unknown  Family history of diabetes mellitus, Age at diagnosis: Age Unknown

## 2024-10-01 NOTE — DISCHARGE NOTE PROVIDER - NSDCMRMEDTOKEN_GEN_ALL_CORE_FT
acetaminophen 160 mg/5 mL oral liquid: 20 milliliter(s) orally every 6 hours as needed for -for moderate to severe pain MDD: 80 mL  Eliquis 2.5 mg oral tablet: 1 tab(s) orally 2 times a day MDD:2 tablets  Levsin SL 0.125 mg sublingual tablet: 1 tab(s) sublingually every 6 hours MDD: 4 TABS  omeprazole 40 mg oral delayed release capsule: 1 cap(s) orally once a day MDD: 1 cap  ondansetron 4 mg oral tablet, disintegratin tab(s) orally every 6 hours as needed for  nausea MDD: 4 tabs

## 2024-10-01 NOTE — DISCHARGE NOTE PROVIDER - HOSPITAL COURSE
Rubina Hale is a 33 year old woman with history of morbid obesity s/p sleeve gastrectomy in [facility] 2 years ago now complicated by refractory GERD. She underwent EGD and UGI that demonstrated evidence of kinking and twisting of the sleeve body with possible retained fundus. Was admitted to [facility] for planned conversion of sleeve gastrectomy to RYGB. Now s/p RA Lap VSG to RYGB. Patient's post-operative course was uncomplicated. Diet was advanced as tolerated and pain was well controlled on medication. On day of discharge, pt deemed stable and ready to return home with plan to follow up as an outpatient.     **incomplete last updated 10/1 Rubina Hale is a 33 year old woman with history of morbid obesity s/p sleeve gastrectomy in [facility] 2 years ago now complicated by refractory GERD. She underwent EGD and UGI that demonstrated evidence of kinking and twisting of the sleeve body with possible retained fundus. Was admitted to [facility] for planned conversion of sleeve gastrectomy to RYGB. Now s/p RA Lap VSG to RYGB. Patient's post-operative course was uncomplicated. Diet was advanced as tolerated and pain was well controlled on medication. On day of

## 2024-10-01 NOTE — DISCHARGE NOTE PROVIDER - NSDCFUADDAPPT_GEN_ALL_CORE_FT
Please follow up with Dr. Duarte in one week; you may call the office to make an appointment at your earliest convenience.

## 2024-10-01 NOTE — H&P ADULT - HISTORY OF PRESENT ILLNESS
Patient is a 33 year old woman with history of morbid obesity s/p sleeve gastrectomy in [facility] 2 years ago now complicated by refractory GERD. She underwent EGD and UGI that demonstrated evidence of kinking and twisting of the sleeve body with possible retained fundus    8/13/24 Echo EF55% mild to mod MR, Stress: 10 METS  [place]: 8  EGD: Zline 35, no HH, Hill 1  UGI (8/2024): Normal upper GI series status post sleeve gastrectomy.

## 2024-10-01 NOTE — PRE-ANESTHESIA EVALUATION ADULT - NSANTHADDINFOFT_GEN_ALL_CORE
Discussed risks of general anesthesia and peripheral nerve injury-including risk of nerve injury. All questions answered and patient is in agreement

## 2024-10-01 NOTE — DISCHARGE NOTE PROVIDER - NSDCCPTREATMENT_GEN_ALL_CORE_FT
PRINCIPAL PROCEDURE  Procedure: Conversion, gastrectomy, sleeve, to gastric bypass, laparoscopic  Findings and Treatment:

## 2024-10-01 NOTE — DISCHARGE NOTE PROVIDER - NSDCFUADDINST_GEN_ALL_CORE_FT
Medications:  1) Please take Tylenol 650 mg every 6 hours by mouth for moderate pain control. Please do not exceed over 4,000 mg of Tylenol a day.   **Do not take any NSAIDs like ibuprofen until discussed with surgeon.  2) Please take Omeprazole 40 mg once a day by mouth.  3) Please take Levsin/hyoscyamine 0.125 mg sublingual every 6 hours.  4) You may take Zofran as needed for nausea every 6 hours.  5) Take Eliquis twice a day starting on day 3 after surgery for 21 days. You will start on 10/3/24  Resume any home medications as instructed. Follow up with your PCP/internist as soon as possible to discuss medications.    General Guidelines:  May shower, NO baths or swimming. Keep incision sites clean & dry.  Do not rub off the surgical glue. It will fall off on its own.  No heavy lifting  >20 pounds or strenuous exercise.   Diet: Bariatric Full Fluids. 60 grams protein daily.  64 fluid ounces water daily. Drink small sips throughout the day not to fall behind.  Call MD if increase in abdominal pain, nausea, vomiting, fever (temperature <101.4F), or chills.  Please follow up with your primary care provider as soon as possible.   Follow up with Dr. Duarte in 1 to 2 weeks. Call the office at  to schedule your appointment. You may shower soap and water over incision sites. Do not scrub. Pat dry when done. No tub bathing or swimming until cleared. Keep incision sites out of the sun as scars will darken. No heavy lifting (>10lbs) or strenuous exercise. Diet: Bariatric Full Fluids. 60 grams protein daily.  64 fluid ounces water daily. Drink small sips throughout the day. Continue diet as outlined by paperwork received as a pre-operative patient. You should be urinating at least 3-4x per day. Call the office if you experience increasing abdominal pain, nausea, vomiting, or temperature >100.4F.  NO ASPIRIN OR NSAIDs until approved by Dr. Duarte. Avoid alcoholic beverages until cleared by Dr. Duarte.       1) Please take Tylenol 650 mg every 4 to 6 hours by mouth for moderate pain control. Please do not exceed over 4,000 mg of Tylenol a day.     2) Please start taking Eliquis 2.5 mg by mouth twice a day starting 3 days after surgery. Please start Friday October 4, 2024 .     3) Please take Omeprazole 40 mg once a day by mouth.     4)  Please take [id number] mg every 6 hours.     5) Please take Zofran 4 mg every 6 hours as needed for nausea and or vomiting.

## 2024-10-01 NOTE — DISCHARGE NOTE PROVIDER - NSDCFUSCHEDAPPT_GEN_ALL_CORE_FT
Amado Duarte  [facility]  BARIATRIC [facility] [address]  Scheduled Appointment: 10/16/2024

## 2024-10-01 NOTE — BRIEF OPERATIVE NOTE - NSICDXBRIEFPROCEDURE_GEN_ALL_CORE_FT
PROCEDURES:  Conversion, gastrectomy, sleeve, to gastric bypass, laparoscopic 01-Oct-2024 12:44:28  Neri Max

## 2024-10-01 NOTE — DISCHARGE NOTE PROVIDER - CARE PROVIDER_API CALL
Amado Duarte  Surgery  [address], [address]  [place] [address]  Phone: [phone]  Fax: [phone]  Follow Up Time: 1 week

## 2024-10-01 NOTE — BRIEF OPERATIVE NOTE - OPERATION/FINDINGS
Dx lap recealing colon mesentery adhered to greater curve of sleeve. Mesenteric rent created and subsequently closed with Vloc. Colon perfusion checked with ICG. Pouch created with 40Fr bougie. Previous permanent suture identified at end of staple line. Staple line imbricated with vicryl. 55cm BP limb and 75 CM jackelin limb. Antecolic GJ created via side to side stapled anastomosis with robotic blue load. Common enterotomy closed in 2 layers. JJ created with side to side anastomosis with DAVE white load. Common enterotomy closed in 2 layers. JJ and Marr closed with absorbable Vloc. GJ leak test negative.

## 2024-10-02 LAB
ANION GAP SERPL CALC-SCNC: 9 MMOL/L — SIGNIFICANT CHANGE UP (ref 5–17)
BASOPHILS # BLD AUTO: 0.02 K/UL — SIGNIFICANT CHANGE UP (ref 0–0.2)
BASOPHILS NFR BLD AUTO: 0.2 % — SIGNIFICANT CHANGE UP (ref 0–2)
BUN SERPL-MCNC: 10 MG/DL — SIGNIFICANT CHANGE UP (ref 7–23)
CALCIUM SERPL-MCNC: 8.4 MG/DL — SIGNIFICANT CHANGE UP (ref 8.4–10.5)
CHLORIDE SERPL-SCNC: 106 MMOL/L — SIGNIFICANT CHANGE UP (ref 96–108)
CO2 SERPL-SCNC: 25 MMOL/L — SIGNIFICANT CHANGE UP (ref 22–31)
CREAT SERPL-MCNC: 0.64 MG/DL — SIGNIFICANT CHANGE UP (ref 0.5–1.3)
EGFR: 121 ML/MIN/1.73M2 — SIGNIFICANT CHANGE UP
EOSINOPHIL # BLD AUTO: 0 K/UL — SIGNIFICANT CHANGE UP (ref 0–0.5)
EOSINOPHIL NFR BLD AUTO: 0 % — SIGNIFICANT CHANGE UP (ref 0–6)
GLUCOSE SERPL-MCNC: 94 MG/DL — SIGNIFICANT CHANGE UP (ref 70–99)
HCT VFR BLD CALC: 33.1 % — LOW (ref 34.5–45)
HGB BLD-MCNC: 10.8 G/DL — LOW (ref 11.5–15.5)
IMM GRANULOCYTES NFR BLD AUTO: 0.6 % — SIGNIFICANT CHANGE UP (ref 0–0.9)
LYMPHOCYTES # BLD AUTO: 1.43 K/UL — SIGNIFICANT CHANGE UP (ref 1–3.3)
LYMPHOCYTES # BLD AUTO: 13.8 % — SIGNIFICANT CHANGE UP (ref 13–44)
MAGNESIUM SERPL-MCNC: 1.9 MG/DL — SIGNIFICANT CHANGE UP (ref 1.6–2.6)
MCHC RBC-ENTMCNC: 26.5 PG — LOW (ref 27–34)
MCHC RBC-ENTMCNC: 32.6 GM/DL — SIGNIFICANT CHANGE UP (ref 32–36)
MCV RBC AUTO: 81.1 FL — SIGNIFICANT CHANGE UP (ref 80–100)
MONOCYTES # BLD AUTO: 0.92 K/UL — HIGH (ref 0–0.9)
MONOCYTES NFR BLD AUTO: 8.9 % — SIGNIFICANT CHANGE UP (ref 2–14)
NEUTROPHILS # BLD AUTO: 7.93 K/UL — HIGH (ref 1.8–7.4)
NEUTROPHILS NFR BLD AUTO: 76.5 % — SIGNIFICANT CHANGE UP (ref 43–77)
NRBC # BLD: 0 /100 WBCS — SIGNIFICANT CHANGE UP (ref 0–0)
PHOSPHATE SERPL-MCNC: 3.2 MG/DL — SIGNIFICANT CHANGE UP (ref 2.5–4.5)
PLATELET # BLD AUTO: 217 K/UL — SIGNIFICANT CHANGE UP (ref 150–400)
POTASSIUM SERPL-MCNC: 4 MMOL/L — SIGNIFICANT CHANGE UP (ref 3.5–5.3)
POTASSIUM SERPL-SCNC: 4 MMOL/L — SIGNIFICANT CHANGE UP (ref 3.5–5.3)
RBC # BLD: 4.08 M/UL — SIGNIFICANT CHANGE UP (ref 3.8–5.2)
RBC # FLD: 14.1 % — SIGNIFICANT CHANGE UP (ref 10.3–14.5)
SODIUM SERPL-SCNC: 140 MMOL/L — SIGNIFICANT CHANGE UP (ref 135–145)
WBC # BLD: 10.36 K/UL — SIGNIFICANT CHANGE UP (ref 3.8–10.5)
WBC # FLD AUTO: 10.36 K/UL — SIGNIFICANT CHANGE UP (ref 3.8–10.5)

## 2024-10-02 RX ORDER — HYDROMORPHONE HYDROCHLORIDE 1 MG/ML
0.25 INJECTION, SOLUTION INTRAMUSCULAR; INTRAVENOUS; SUBCUTANEOUS EVERY 6 HOURS
Refills: 0 | Status: DISCONTINUED | OUTPATIENT
Start: 2024-10-02 | End: 2024-10-03

## 2024-10-02 RX ORDER — SODIUM CHLORIDE IRRIG SOLUTION 0.9 %
1000 SOLUTION, IRRIGATION IRRIGATION
Refills: 0 | Status: DISCONTINUED | OUTPATIENT
Start: 2024-10-02 | End: 2024-10-03

## 2024-10-02 RX ORDER — ENOXAPARIN SODIUM 150 MG/ML
40 INJECTION SUBCUTANEOUS EVERY 24 HOURS
Refills: 0 | Status: DISCONTINUED | OUTPATIENT
Start: 2024-10-02 | End: 2024-10-03

## 2024-10-02 RX ORDER — ACETAMINOPHEN 325 MG
1000 TABLET ORAL EVERY 6 HOURS
Refills: 0 | Status: COMPLETED | OUTPATIENT
Start: 2024-10-02 | End: 2024-10-03

## 2024-10-02 RX ORDER — OXYCODONE HYDROCHLORIDE 30 MG/1
5 TABLET, FILM COATED, EXTENDED RELEASE ORAL EVERY 4 HOURS
Refills: 0 | Status: DISCONTINUED | OUTPATIENT
Start: 2024-10-02 | End: 2024-10-03

## 2024-10-02 RX ORDER — OXYCODONE HYDROCHLORIDE 30 MG/1
10 TABLET, FILM COATED, EXTENDED RELEASE ORAL EVERY 4 HOURS
Refills: 0 | Status: DISCONTINUED | OUTPATIENT
Start: 2024-10-02 | End: 2024-10-03

## 2024-10-02 RX ADMIN — Medication 400 MILLIGRAM(S): at 14:52

## 2024-10-02 RX ADMIN — SCOPOLAMINE 1 PATCH: 1 PATCH, EXTENDED RELEASE TRANSDERMAL at 19:25

## 2024-10-02 RX ADMIN — Medication 0.12 MILLIGRAM(S): at 11:56

## 2024-10-02 RX ADMIN — KETOROLAC TROMETHAMINE 15 MILLIGRAM(S): 10 TABLET, FILM COATED ORAL at 00:31

## 2024-10-02 RX ADMIN — KETOROLAC TROMETHAMINE 15 MILLIGRAM(S): 10 TABLET, FILM COATED ORAL at 12:10

## 2024-10-02 RX ADMIN — Medication 1000 MILLIGRAM(S): at 07:32

## 2024-10-02 RX ADMIN — Medication 400 MILLIGRAM(S): at 23:24

## 2024-10-02 RX ADMIN — HYDROMORPHONE HYDROCHLORIDE 0.25 MILLIGRAM(S): 1 INJECTION, SOLUTION INTRAMUSCULAR; INTRAVENOUS; SUBCUTANEOUS at 03:00

## 2024-10-02 RX ADMIN — KETOROLAC TROMETHAMINE 15 MILLIGRAM(S): 10 TABLET, FILM COATED ORAL at 17:21

## 2024-10-02 RX ADMIN — Medication 1000 MILLIGRAM(S): at 01:30

## 2024-10-02 RX ADMIN — HYDROMORPHONE HYDROCHLORIDE 0.5 MILLIGRAM(S): 1 INJECTION, SOLUTION INTRAMUSCULAR; INTRAVENOUS; SUBCUTANEOUS at 10:10

## 2024-10-02 RX ADMIN — KETOROLAC TROMETHAMINE 15 MILLIGRAM(S): 10 TABLET, FILM COATED ORAL at 05:15

## 2024-10-02 RX ADMIN — SCOPOLAMINE 1 PATCH: 1 PATCH, EXTENDED RELEASE TRANSDERMAL at 06:49

## 2024-10-02 RX ADMIN — ENOXAPARIN SODIUM 40 MILLIGRAM(S): 150 INJECTION SUBCUTANEOUS at 14:52

## 2024-10-02 RX ADMIN — Medication 120 MILLILITER(S): at 14:52

## 2024-10-02 RX ADMIN — KETOROLAC TROMETHAMINE 15 MILLIGRAM(S): 10 TABLET, FILM COATED ORAL at 11:55

## 2024-10-02 RX ADMIN — Medication 1000 MILLIGRAM(S): at 15:05

## 2024-10-02 RX ADMIN — Medication 75 MILLILITER(S): at 19:26

## 2024-10-02 RX ADMIN — HYDROMORPHONE HYDROCHLORIDE 0.5 MILLIGRAM(S): 1 INJECTION, SOLUTION INTRAMUSCULAR; INTRAVENOUS; SUBCUTANEOUS at 09:56

## 2024-10-02 RX ADMIN — Medication 400 MILLIGRAM(S): at 01:01

## 2024-10-02 RX ADMIN — Medication 0.12 MILLIGRAM(S): at 23:20

## 2024-10-02 RX ADMIN — PANTOPRAZOLE SODIUM 40 MILLIGRAM(S): 40 TABLET, DELAYED RELEASE ORAL at 11:56

## 2024-10-02 RX ADMIN — OXYCODONE HYDROCHLORIDE 5 MILLIGRAM(S): 30 TABLET, FILM COATED, EXTENDED RELEASE ORAL at 21:24

## 2024-10-02 RX ADMIN — KETOROLAC TROMETHAMINE 15 MILLIGRAM(S): 10 TABLET, FILM COATED ORAL at 05:35

## 2024-10-02 RX ADMIN — KETOROLAC TROMETHAMINE 15 MILLIGRAM(S): 10 TABLET, FILM COATED ORAL at 00:12

## 2024-10-02 RX ADMIN — HYDROMORPHONE HYDROCHLORIDE 0.25 MILLIGRAM(S): 1 INJECTION, SOLUTION INTRAMUSCULAR; INTRAVENOUS; SUBCUTANEOUS at 02:13

## 2024-10-02 RX ADMIN — KETOROLAC TROMETHAMINE 15 MILLIGRAM(S): 10 TABLET, FILM COATED ORAL at 23:24

## 2024-10-02 RX ADMIN — Medication 0.12 MILLIGRAM(S): at 17:20

## 2024-10-02 RX ADMIN — KETOROLAC TROMETHAMINE 15 MILLIGRAM(S): 10 TABLET, FILM COATED ORAL at 17:53

## 2024-10-02 RX ADMIN — Medication 0.12 MILLIGRAM(S): at 01:02

## 2024-10-02 RX ADMIN — Medication 400 MILLIGRAM(S): at 07:02

## 2024-10-02 RX ADMIN — Medication 0.12 MILLIGRAM(S): at 06:19

## 2024-10-02 RX ADMIN — THIAMINE HYDROCHLORIDE 105 MILLIGRAM(S): 100 INJECTION, SOLUTION INTRAMUSCULAR; INTRAVENOUS at 17:20

## 2024-10-02 NOTE — DIETITIAN INITIAL EVALUATION ADULT - OTHER INFO
Patient is a 33 year old woman with history of morbid obesity status post sleeve gastrectomy in [facility] 2 years ago now complicated by refractory GERD. She underwent EGD and UGI that demonstrated evidence of kinking and twisting of the sleeve body with possible retained fundus. Pt is now status post robotic assisted Laparoscopic VSG to RYGB (10/1).     Pt seen on 8 [place] at bedside. On assessment, pt resting in bed. Pt's family was present at bedside. Pt was not tolerating PO yet, NPO status as of this morning, medical team was to advance pt's diet later in the morning to Bariatri Clear liquids diet. Pain and nausea somewhat controlled. Discussed volumes of various cup sizes on tray table and encouraged aiming for 4 oz/hr as tolerated. Prepared with protein shakes, with plan to get vitamins after discharge. RD provided in-depth education on diet advancement and specific nutrient needs status-post conversion of gastric sleeve to gastric bypass surgery. No known food allergies. No dietary restrictions at home. Skin: surgical incisions. GI: WDL per flowsheet. Labs reviewed: serum Glucose was elevated, now within normal ranges; will monitor trends. Pt's wt on admission was ~170 pounds, pt's ideal body weight is ~115 pounds, pt is ~148% of ideal body weight; ideal body weight to be utilized for nutrient calculations. RD observed pt with no overt signs of muscle or fat wasting. Based on ASPEN guidelines, pt does not meet criteria for malnutrition at this time. RD to continue to follow up.

## 2024-10-02 NOTE — DIETITIAN INITIAL EVALUATION ADULT - PERSON TAUGHT/METHOD
RD Discussed volumes of various cup sizes on tray table and encouraged aiming for 4 oz/hr as tolerated. Pt is prepared with protein shakes, with plan to get vitamins after discharge. RD provided indepth education on diet advancement and specific nutrient needs status post conversion of gastric sleeve to bypass. Pt appears receptive, verbalized understanding. Written nutrition education handouts provided./verbal instruction/written material/patient instructed

## 2024-10-02 NOTE — DIETITIAN INITIAL EVALUATION ADULT - PERTINENT MEDS FT
MEDICATIONS  (STANDING):  hyoscyamine SL 0.125 milliGRAM(s) SubLingual every 6 hours  ketorolac   Injectable 15 milliGRAM(s) IV Push every 6 hours  lactated ringers. 1000 milliLiter(s) (120 mL/Hr) IV Continuous <Continuous>  pantoprazole  Injectable 40 milliGRAM(s) IV Push daily  thiamine IVPB 500 milliGRAM(s) IV Intermittent every 24 hours    MEDICATIONS  (PRN):  HYDROmorphone  Injectable 0.25 milliGRAM(s) IV Push every 6 hours PRN breakthrough pain  ondansetron Injectable 4 milliGRAM(s) IV Push every 6 hours PRN Nausea and/or Vomiting  oxyCODONE    Solution 5 milliGRAM(s) Oral every 4 hours PRN Moderate Pain (4 - 6)  oxyCODONE    Solution 10 milliGRAM(s) Oral every 4 hours PRN Severe Pain (7 - 10)

## 2024-10-02 NOTE — DIETITIAN INITIAL EVALUATION ADULT - OTHER CALCULATIONS
Above energy needs calculated for wt maintenance (20-25kcal/kg ideal body weight).  Weeks 1-2 estimated needs: 523-627 kcal/day (10-12kcal/kg ideal body weight), 63-78g protein/day (1.2-1.5g/kg ideal body weight), >/=64oz clear fluids.

## 2024-10-02 NOTE — DIETITIAN INITIAL EVALUATION ADULT - PERTINENT LABORATORY DATA
10-02    140  |  106  |  10  ----------------------------<  94  4.0   |  25  |  0.64    Ca    8.4      02 Oct 2024 05:30  Phos  3.2     10-02  Mg     1.9     10-02

## 2024-10-03 ENCOUNTER — TRANSCRIPTION ENCOUNTER (OUTPATIENT)
Age: 34
End: 2024-10-03

## 2024-10-03 VITALS — TEMPERATURE: 98 F

## 2024-10-03 LAB
ANION GAP SERPL CALC-SCNC: 9 MMOL/L — SIGNIFICANT CHANGE UP (ref 5–17)
BASOPHILS # BLD AUTO: 0.04 K/UL — SIGNIFICANT CHANGE UP (ref 0–0.2)
BASOPHILS NFR BLD AUTO: 0.4 % — SIGNIFICANT CHANGE UP (ref 0–2)
BUN SERPL-MCNC: 8 MG/DL — SIGNIFICANT CHANGE UP (ref 7–23)
CALCIUM SERPL-MCNC: 8.6 MG/DL — SIGNIFICANT CHANGE UP (ref 8.4–10.5)
CHLORIDE SERPL-SCNC: 106 MMOL/L — SIGNIFICANT CHANGE UP (ref 96–108)
CO2 SERPL-SCNC: 26 MMOL/L — SIGNIFICANT CHANGE UP (ref 22–31)
CREAT SERPL-MCNC: 0.72 MG/DL — SIGNIFICANT CHANGE UP (ref 0.5–1.3)
EGFR: 115 ML/MIN/1.73M2 — SIGNIFICANT CHANGE UP
EOSINOPHIL # BLD AUTO: 0.06 K/UL — SIGNIFICANT CHANGE UP (ref 0–0.5)
EOSINOPHIL NFR BLD AUTO: 0.7 % — SIGNIFICANT CHANGE UP (ref 0–6)
GLUCOSE SERPL-MCNC: 86 MG/DL — SIGNIFICANT CHANGE UP (ref 70–99)
HCT VFR BLD CALC: 34.9 % — SIGNIFICANT CHANGE UP (ref 34.5–45)
HGB BLD-MCNC: 11.1 G/DL — LOW (ref 11.5–15.5)
IMM GRANULOCYTES NFR BLD AUTO: 0.2 % — SIGNIFICANT CHANGE UP (ref 0–0.9)
LYMPHOCYTES # BLD AUTO: 2.98 K/UL — SIGNIFICANT CHANGE UP (ref 1–3.3)
LYMPHOCYTES # BLD AUTO: 33.3 % — SIGNIFICANT CHANGE UP (ref 13–44)
MAGNESIUM SERPL-MCNC: 1.9 MG/DL — SIGNIFICANT CHANGE UP (ref 1.6–2.6)
MCHC RBC-ENTMCNC: 25.5 PG — LOW (ref 27–34)
MCHC RBC-ENTMCNC: 31.8 GM/DL — LOW (ref 32–36)
MCV RBC AUTO: 80 FL — SIGNIFICANT CHANGE UP (ref 80–100)
MONOCYTES # BLD AUTO: 0.75 K/UL — SIGNIFICANT CHANGE UP (ref 0–0.9)
MONOCYTES NFR BLD AUTO: 8.4 % — SIGNIFICANT CHANGE UP (ref 2–14)
NEUTROPHILS # BLD AUTO: 5.1 K/UL — SIGNIFICANT CHANGE UP (ref 1.8–7.4)
NEUTROPHILS NFR BLD AUTO: 57 % — SIGNIFICANT CHANGE UP (ref 43–77)
NRBC # BLD: 0 /100 WBCS — SIGNIFICANT CHANGE UP (ref 0–0)
PHOSPHATE SERPL-MCNC: 3 MG/DL — SIGNIFICANT CHANGE UP (ref 2.5–4.5)
PLATELET # BLD AUTO: 221 K/UL — SIGNIFICANT CHANGE UP (ref 150–400)
POTASSIUM SERPL-MCNC: 4.2 MMOL/L — SIGNIFICANT CHANGE UP (ref 3.5–5.3)
POTASSIUM SERPL-SCNC: 4.2 MMOL/L — SIGNIFICANT CHANGE UP (ref 3.5–5.3)
RBC # BLD: 4.36 M/UL — SIGNIFICANT CHANGE UP (ref 3.8–5.2)
RBC # FLD: 14.2 % — SIGNIFICANT CHANGE UP (ref 10.3–14.5)
SODIUM SERPL-SCNC: 141 MMOL/L — SIGNIFICANT CHANGE UP (ref 135–145)
WBC # BLD: 8.95 K/UL — SIGNIFICANT CHANGE UP (ref 3.8–10.5)
WBC # FLD AUTO: 8.95 K/UL — SIGNIFICANT CHANGE UP (ref 3.8–10.5)

## 2024-10-03 PROCEDURE — 84100 ASSAY OF PHOSPHORUS: CPT

## 2024-10-03 PROCEDURE — 36415 COLL VENOUS BLD VENIPUNCTURE: CPT

## 2024-10-03 PROCEDURE — S2900: CPT

## 2024-10-03 PROCEDURE — 85025 COMPLETE CBC W/AUTO DIFF WBC: CPT

## 2024-10-03 PROCEDURE — 83735 ASSAY OF MAGNESIUM: CPT

## 2024-10-03 PROCEDURE — 86900 BLOOD TYPING SEROLOGIC ABO: CPT

## 2024-10-03 PROCEDURE — 85027 COMPLETE CBC AUTOMATED: CPT

## 2024-10-03 PROCEDURE — 86850 RBC ANTIBODY SCREEN: CPT

## 2024-10-03 PROCEDURE — C1889: CPT

## 2024-10-03 PROCEDURE — 86901 BLOOD TYPING SEROLOGIC RH(D): CPT

## 2024-10-03 PROCEDURE — C9399: CPT

## 2024-10-03 PROCEDURE — 80048 BASIC METABOLIC PNL TOTAL CA: CPT

## 2024-10-03 RX ORDER — HYOSCYAMINE SULFATE 0.125 MG
1 TABLET ORAL
Qty: 28 | Refills: 0
Start: 2024-10-03 | End: 2024-10-09

## 2024-10-03 RX ORDER — ACETAMINOPHEN 325 MG
20 TABLET ORAL
Qty: 320 | Refills: 0
Start: 2024-10-03 | End: 2024-10-06

## 2024-10-03 RX ORDER — ONDANSETRON HCL/PF 4 MG/2 ML
1 VIAL (ML) INJECTION
Qty: 12 | Refills: 0
Start: 2024-10-03 | End: 2024-10-05

## 2024-10-03 RX ORDER — APIXABAN 5 MG/1
1 TABLET, FILM COATED ORAL
Qty: 60 | Refills: 0
Start: 2024-10-03 | End: 2024-11-01

## 2024-10-03 RX ORDER — ACETAMINOPHEN 325 MG
650 TABLET ORAL EVERY 6 HOURS
Refills: 0 | Status: DISCONTINUED | OUTPATIENT
Start: 2024-10-03 | End: 2024-10-03

## 2024-10-03 RX ADMIN — OXYCODONE HYDROCHLORIDE 5 MILLIGRAM(S): 30 TABLET, FILM COATED, EXTENDED RELEASE ORAL at 10:45

## 2024-10-03 RX ADMIN — Medication 75 MILLILITER(S): at 10:45

## 2024-10-03 RX ADMIN — PANTOPRAZOLE SODIUM 40 MILLIGRAM(S): 40 TABLET, DELAYED RELEASE ORAL at 11:35

## 2024-10-03 RX ADMIN — KETOROLAC TROMETHAMINE 15 MILLIGRAM(S): 10 TABLET, FILM COATED ORAL at 05:32

## 2024-10-03 RX ADMIN — KETOROLAC TROMETHAMINE 15 MILLIGRAM(S): 10 TABLET, FILM COATED ORAL at 13:07

## 2024-10-03 RX ADMIN — KETOROLAC TROMETHAMINE 15 MILLIGRAM(S): 10 TABLET, FILM COATED ORAL at 11:35

## 2024-10-03 RX ADMIN — SCOPOLAMINE 1 PATCH: 1 PATCH, EXTENDED RELEASE TRANSDERMAL at 06:41

## 2024-10-03 RX ADMIN — Medication 0.12 MILLIGRAM(S): at 11:35

## 2024-10-03 RX ADMIN — OXYCODONE HYDROCHLORIDE 5 MILLIGRAM(S): 30 TABLET, FILM COATED, EXTENDED RELEASE ORAL at 13:07

## 2024-10-03 RX ADMIN — Medication 1000 MILLIGRAM(S): at 00:30

## 2024-10-03 RX ADMIN — OXYCODONE HYDROCHLORIDE 10 MILLIGRAM(S): 30 TABLET, FILM COATED, EXTENDED RELEASE ORAL at 14:24

## 2024-10-03 RX ADMIN — OXYCODONE HYDROCHLORIDE 5 MILLIGRAM(S): 30 TABLET, FILM COATED, EXTENDED RELEASE ORAL at 00:00

## 2024-10-03 RX ADMIN — KETOROLAC TROMETHAMINE 15 MILLIGRAM(S): 10 TABLET, FILM COATED ORAL at 06:41

## 2024-10-03 RX ADMIN — Medication 1000 MILLIGRAM(S): at 06:41

## 2024-10-03 RX ADMIN — KETOROLAC TROMETHAMINE 15 MILLIGRAM(S): 10 TABLET, FILM COATED ORAL at 00:31

## 2024-10-03 RX ADMIN — Medication 400 MILLIGRAM(S): at 05:31

## 2024-10-03 RX ADMIN — Medication 0.12 MILLIGRAM(S): at 05:32

## 2024-10-03 NOTE — DISCHARGE NOTE NURSING/CASE MANAGEMENT/SOCIAL WORK - PATIENT PORTAL LINK FT
You can access the FollowMyHealth Patient Portal offered by [facility] by registering at the following website: [email or web address]. By joining [employer]’s FollowMyHealth portal, you will also be able to view your health information using other applications (apps) compatible with our system.

## 2024-10-03 NOTE — DISCHARGE NOTE NURSING/CASE MANAGEMENT/SOCIAL WORK - NSDCPEFALRISK_GEN_ALL_CORE
For information on Fall & Injury Prevention, visit: [email or web address] OR  [email or web address] OR  https://www.cdc.gov/steadi/patient.html

## 2024-10-03 NOTE — PROGRESS NOTE ADULT - SUBJECTIVE AND OBJECTIVE BOX
INTERVAL HPI/OVERNIGHT EVENTS: hgb 11.5 (13.2), pTOV, +n/-v  10/1: RA lap VSG to RYGB,c/o nausea and incisional pain at POC    STATUS POST:  Robotic assisted laparoscopic conversion of sleeve to RYGB     POST OPERATIVE DAY #: 2     SUBJECTIVE: Patient examined bedside with chief resident. Patient complains of incisional pain. Patient reports that she is tolerating sips of bariatric clear liquid diet but cannot quantify amount. Patient reports she is ambulating and using incentive spirometer. Patient denies SOB, chest pain , nausea and emesis. Patient making adequate urine output.           Vital Signs Last 24 Hrs  T(C): 37.2 (02 Oct 2024 04:38), Max: 37.2 (02 Oct 2024 04:38)  T(F): 98.9 (02 Oct 2024 04:38), Max: 98.9 (02 Oct 2024 04:38)  HR: 52 (02 Oct 2024 08:00) (52 - 87)  BP: 121/56 (02 Oct 2024 08:00) (108/58 - 124/75)  BP(mean): 81 (02 Oct 2024 08:00) (66 - 94)  RR: 18 (02 Oct 2024 08:00) (14 - 20)  SpO2: 98% (02 Oct 2024 08:00) (98% - 100%)    Parameters below as of 02 Oct 2024 08:00  Patient On (Oxygen Delivery Method): room air      I&O's Detail    01 Oct 2024 07:01  -  02 Oct 2024 07:00  --------------------------------------------------------  IN:    Lactated Ringers Bolus: 600 mL  Total IN: 600 mL    OUT:    Voided (mL): 1450 mL  Total OUT: 1450 mL    Total NET: -850 mL          General: NAD, resting comfortably in bed  C/V: NSR  Pulm: Nonlabored breathing, no respiratory distress  Abd: Soft, non-distended, TTP around incision site, incision clean dry and intact.   Extrem: WWP, no edema, SCDs in place      LABS:                        10.8   10.36 )-----------( 217      ( 02 Oct 2024 05:30 )             33.1     10-02    140  |  106  |  10  ----------------------------<  94  4.0   |  25  |  0.64    Ca    8.4      02 Oct 2024 05:30  Phos  3.2     10-02  Mg     1.9     10-02        Urinalysis Basic - ( 02 Oct 2024 05:30 )    Color: x / Appearance: x / SG: x / pH: x  Gluc: 94 mg/dL / Ketone: x  / Bili: x / Urobili: x   Blood: x / Protein: x / Nitrite: x   Leuk Esterase: x / RBC: x / WBC x   Sq Epi: x / Non Sq Epi: x / Bacteria: x      
POST-OPERATIVE NOTE    Procedure:  Robotic assisted laparoscopic conversion of sleeve to RYGB     Diagnosis/Indication: GERD    Surgeon: Dr. Duarte     S: Pt has no complaints. Denies CP, SOB, CASTRO, calf tenderness. Pain controlled with medication.    O:  T(C): --  T(F): --  HR: 82 (10-01-24 @ 14:57) (81 - 87)  BP: 119/68 (10-01-24 @ 14:57) (114/72 - 124/75)  RR: 19 (10-01-24 @ 14:57) (14 - 19)  SpO2: 100% (10-01-24 @ 14:57) (100% - 100%)  Wt(kg): --    10-01    138  |  102  |  13  ----------------------------<  102[H]  4.0   |  27  |  0.78    Ca    9.3      01 Oct 2024 08:16        Gen: NAD, resting comfortably in bed  C/V: NSR  Pulm: Nonlabored breathing, no respiratory distress  Abd: soft, non distended , TTP around incision site, incision clean dry and intact   Extrem: WWP, no calf edema, SCDs in place      
INTERVAL HPI/OVERNIGHT EVENTS: : minimal PO intake, -n/-v    STATUS POST: VSG to RYGB    POST OPERATIVE DAY #: 2    SUBJECTIVE:  Patient seen and examined with chief resident on morning rounds. Reports that her she is having some mild abdominal pain, worse when she moves around. Has not been able to drink much and gets nauseous when she does. Denies any vomiting. Has not had gas or a bowel movement now.     MEDICATIONS  (STANDING):  enoxaparin Injectable 40 milliGRAM(s) SubCutaneous every 24 hours  hyoscyamine SL 0.125 milliGRAM(s) SubLingual every 6 hours  ketorolac   Injectable 15 milliGRAM(s) IV Push every 6 hours  lactated ringers. 1000 milliLiter(s) (75 mL/Hr) IV Continuous <Continuous>  pantoprazole  Injectable 40 milliGRAM(s) IV Push daily  thiamine IVPB 500 milliGRAM(s) IV Intermittent every 24 hours    MEDICATIONS  (PRN):  HYDROmorphone  Injectable 0.25 milliGRAM(s) IV Push every 6 hours PRN breakthrough pain  ondansetron Injectable 4 milliGRAM(s) IV Push every 6 hours PRN Nausea and/or Vomiting  oxyCODONE    Solution 5 milliGRAM(s) Oral every 4 hours PRN Moderate Pain (4 - 6)  oxyCODONE    Solution 10 milliGRAM(s) Oral every 4 hours PRN Severe Pain (7 - 10)      Vital Signs Last 24 Hrs  T(C): 36.9 (03 Oct 2024 04:54), Max: 37 (02 Oct 2024 18:03)  T(F): 98.5 (03 Oct 2024 04:54), Max: 98.6 (02 Oct 2024 18:03)  HR: 54 (03 Oct 2024 04:22) (52 - 64)  BP: 105/54 (03 Oct 2024 04:22) (104/58 - 121/58)  BP(mean): 75 (03 Oct 2024 04:22) (72 - 85)  RR: 18 (03 Oct 2024 04:22) (16 - 18)  SpO2: 97% (03 Oct 2024 04:22) (97% - 99%)    Parameters below as of 03 Oct 2024 04:22  Patient On (Oxygen Delivery Method): room air        Physical exam:  General Appearance: Appears well, NAD  Pulmonary: Nonlabored breathing, no respiratory distress  HEENT: NTAC  Abdomen: Soft, nondistended, diffuse tenderness to palpation. No rebound or guarding.  Extremities: WWP, SCD's in place     I&O's Detail    01 Oct 2024 07:01  -  02 Oct 2024 07:00  --------------------------------------------------------  IN:    Lactated Ringers Bolus: 600 mL  Total IN: 600 mL    OUT:    Voided (mL): 1450 mL  Total OUT: 1450 mL    Total NET: -850 mL      02 Oct 2024 07:01  -  03 Oct 2024 06:56  --------------------------------------------------------  IN:    Lactated Ringers: 1080 mL    Lactated Ringers: 900 mL  Total IN: 1980 mL    OUT:    Voided (mL): 1900 mL  Total OUT: 1900 mL    Total NET: 80 mL          LABS:                        11.1   8.95  )-----------( 221      ( 03 Oct 2024 05:30 )             34.9     10-03    141  |  106  |  8   ----------------------------<  86  4.2   |  26  |  0.72    Ca    8.6      03 Oct 2024 05:30  Phos  3.0     10-03  Mg     1.9     10-03        Urinalysis Basic - ( 03 Oct 2024 05:30 )    Color: x / Appearance: x / SG: x / pH: x  Gluc: 86 mg/dL / Ketone: x  / Bili: x / Urobili: x   Blood: x / Protein: x / Nitrite: x   Leuk Esterase: x / RBC: x / WBC x   Sq Epi: x / Non Sq Epi: x / Bacteria: x        RADIOLOGY & ADDITIONAL STUDIES:
no

## 2024-10-03 NOTE — PROGRESS NOTE ADULT - ASSESSMENT
34F pmhx of GERD and pshx of sleeve gastrectomy in [facility] (2022) now s/p RA Lap VSG to RYGB (10/1)    NPO/IVF  Pain/nausea control  hyosciamine  protonix  thiamine   TXA x 1 8 hours postop  AM labs   nutrition consult
34F pmhx of GERD and pshx of sleeve gastrectomy in [facility] (2022) now s/p RA Lap VSG to RYGB (10/1)    NPO/IVF  Pain/nausea control  hyosciamine  protonix  thiamine   TXA x 1 8 hours postop  AM labs   nutrition consult
34F pmhx of GERD and pshx of sleeve gastrectomy in [facility] (2022) now s/p RA Lap VSG to RYGB (10/1)    BCLD/IVF  Pain/nausea control  hyosciamine  protonix  thiamine   TXA x 1 8 hours postop  AM labs   nutrition consult

## 2024-10-16 ENCOUNTER — APPOINTMENT (OUTPATIENT)
Dept: BARIATRICS | Facility: CLINIC | Age: 34
End: 2024-10-16
Payer: COMMERCIAL

## 2024-10-16 VITALS
OXYGEN SATURATION: 100 % | TEMPERATURE: 97.2 F | BODY MASS INDEX: 27.46 KG/M2 | WEIGHT: 155 LBS | HEART RATE: 84 BPM | HEIGHT: 63 IN | SYSTOLIC BLOOD PRESSURE: 118 MMHG | DIASTOLIC BLOOD PRESSURE: 76 MMHG

## 2024-10-16 DIAGNOSIS — Z98.84 BARIATRIC SURGERY STATUS: ICD-10-CM

## 2024-10-16 PROCEDURE — 99024 POSTOP FOLLOW-UP VISIT: CPT

## 2024-10-30 ENCOUNTER — APPOINTMENT (OUTPATIENT)
Dept: BARIATRICS | Facility: CLINIC | Age: 34
End: 2024-10-30
Payer: COMMERCIAL

## 2024-10-30 VITALS
WEIGHT: 151 LBS | HEIGHT: 63 IN | OXYGEN SATURATION: 99 % | HEART RATE: 73 BPM | BODY MASS INDEX: 26.75 KG/M2 | DIASTOLIC BLOOD PRESSURE: 70 MMHG | SYSTOLIC BLOOD PRESSURE: 123 MMHG | TEMPERATURE: 97.3 F

## 2024-10-30 DIAGNOSIS — Z98.84 BARIATRIC SURGERY STATUS: ICD-10-CM

## 2024-10-30 PROCEDURE — 99024 POSTOP FOLLOW-UP VISIT: CPT

## 2024-12-04 ENCOUNTER — NON-APPOINTMENT (OUTPATIENT)
Age: 34
End: 2024-12-04

## 2024-12-06 ENCOUNTER — NON-APPOINTMENT (OUTPATIENT)
Age: 34
End: 2024-12-06

## 2025-01-21 NOTE — ED PROVIDER NOTE - CPE EDP CARDIAC NORM
normal... Reason For Emergent Surgery: After examining the patient and evaluating the history and clinical presentation, the decision was made to perform major surgery. Major Surgery (90 Day Global Surgeries Only) To Be Peformed Today Or Tomorrow: Mohs Surgery with Adjacent Tissue Transfer Repair Usage Warning: This plan is only intended for use with surgical procedures which generate a 90 day global period.  These procedures are listed below.  Use of this plan for other procedures (Mohs surgery or excision with a different repair than listed below for example) is inappropriate and will increase your risk of failing an audit. Detail Level: Simple Date Of Surgery: today

## 2025-01-22 ENCOUNTER — APPOINTMENT (OUTPATIENT)
Dept: BARIATRICS | Facility: CLINIC | Age: 35
End: 2025-01-22
Payer: COMMERCIAL

## 2025-01-22 VITALS
TEMPERATURE: 96.8 F | SYSTOLIC BLOOD PRESSURE: 111 MMHG | WEIGHT: 136 LBS | HEART RATE: 69 BPM | DIASTOLIC BLOOD PRESSURE: 78 MMHG | BODY MASS INDEX: 24.1 KG/M2 | OXYGEN SATURATION: 99 % | HEIGHT: 63 IN

## 2025-01-22 DIAGNOSIS — Z98.84 BARIATRIC SURGERY STATUS: ICD-10-CM

## 2025-01-22 DIAGNOSIS — Z00.00 ENCOUNTER FOR GENERAL ADULT MEDICAL EXAMINATION W/OUT ABNORMAL FINDINGS: ICD-10-CM

## 2025-01-22 PROCEDURE — 99214 OFFICE O/P EST MOD 30 MIN: CPT

## 2025-01-22 PROCEDURE — G2211 COMPLEX E/M VISIT ADD ON: CPT | Mod: NC

## 2025-02-19 ENCOUNTER — APPOINTMENT (OUTPATIENT)
Dept: BARIATRICS | Facility: CLINIC | Age: 35
End: 2025-02-19

## (undated) DEVICE — VENODYNE/SCD SLEEVE CALF MEDIUM

## (undated) DEVICE — INSUFFLATION NDL COVIDIEN SURGINEEDLE VERESS 120MM

## (undated) DEVICE — XI VESSEL SEALER

## (undated) DEVICE — XI STAPLER SUREFORM 60

## (undated) DEVICE — Device

## (undated) DEVICE — XI OBTURATOR OPTICAL BLADELESS 8MM

## (undated) DEVICE — SUT VLOC 180 3-0 6" P-12 UNDYED

## (undated) DEVICE — XI SEAL UNIVERSIAL 5-12MM

## (undated) DEVICE — XI SEAL UNIV 5- 8 MM

## (undated) DEVICE — SUT STRATAFIX SPIRAL PDS PLUS 2-0 30CM SH

## (undated) DEVICE — SUT STRATAFIX SPIRAL PDS PLUS 2-0 10X10CM SH VIOLET

## (undated) DEVICE — GLV 7 PROTEXIS (WHITE)

## (undated) DEVICE — DRSG GAUZE PACKTNER ROLL

## (undated) DEVICE — FORCEP RADIAL JAW 4 W NDL 2.2MM 2.8MM 240CM ORANGE DISP

## (undated) DEVICE — XI DRAPE ARM

## (undated) DEVICE — XI ENDOWRIST SUCTION IRRIGATOR 8MM

## (undated) DEVICE — D HELP - CLEARVIEW CLEARIFY SYSTEM

## (undated) DEVICE — XI ENDOWRIST 12 - 8 MM CANNULA REDUCER

## (undated) DEVICE — SUT PDS II 2-0 27" SH

## (undated) DEVICE — SUT VICRYL PLUS 0 54" TIES

## (undated) DEVICE — TUBING ERBE CO2 OLYMPUS CONNECTOR

## (undated) DEVICE — PACK GENERAL LAPAROSCOPY

## (undated) DEVICE — TUBING STRYKER PNEUMOCLEAR HIGH FLOW

## (undated) DEVICE — XI DRAPE COLUMN

## (undated) DEVICE — SUT SILK 3-0 30" SH

## (undated) DEVICE — XI TIP COVER

## (undated) DEVICE — TUBING HYBRID CO2

## (undated) DEVICE — SYR LUER LOK 20CC

## (undated) DEVICE — SUT MONOCRYL 4-0 27" PS-2 UNDYED

## (undated) DEVICE — SUT NYLON 3-0 18" PS-2

## (undated) DEVICE — KIT ENDO PROCEDURE CUST W/VLV

## (undated) DEVICE — ELCTR BOVIE PENCIL HANDPIECE ROCKER SWITCH 15FT

## (undated) DEVICE — WARMING BLANKET LOWER ADULT